# Patient Record
Sex: FEMALE
[De-identification: names, ages, dates, MRNs, and addresses within clinical notes are randomized per-mention and may not be internally consistent; named-entity substitution may affect disease eponyms.]

---

## 2019-09-18 PROBLEM — Z00.00 ENCOUNTER FOR PREVENTIVE HEALTH EXAMINATION: Status: ACTIVE | Noted: 2019-09-18

## 2019-09-25 ENCOUNTER — APPOINTMENT (OUTPATIENT)
Dept: PULMONOLOGY | Facility: CLINIC | Age: 74
End: 2019-09-25
Payer: MEDICARE

## 2019-09-25 VITALS
SYSTOLIC BLOOD PRESSURE: 142 MMHG | WEIGHT: 148 LBS | HEIGHT: 66.54 IN | DIASTOLIC BLOOD PRESSURE: 80 MMHG | OXYGEN SATURATION: 93 % | BODY MASS INDEX: 23.5 KG/M2 | HEART RATE: 87 BPM

## 2019-09-25 DIAGNOSIS — Z87.891 PERSONAL HISTORY OF NICOTINE DEPENDENCE: ICD-10-CM

## 2019-09-25 DIAGNOSIS — Z86.79 PERSONAL HISTORY OF OTHER DISEASES OF THE CIRCULATORY SYSTEM: ICD-10-CM

## 2019-09-25 DIAGNOSIS — Z86.73 PERSONAL HISTORY OF TRANSIENT ISCHEMIC ATTACK (TIA), AND CEREBRAL INFARCTION W/OUT RESIDUAL DEFICITS: ICD-10-CM

## 2019-09-25 PROCEDURE — 99204 OFFICE O/P NEW MOD 45 MIN: CPT | Mod: 25

## 2019-09-25 PROCEDURE — 94060 EVALUATION OF WHEEZING: CPT

## 2019-09-25 PROCEDURE — 94727 GAS DIL/WSHOT DETER LNG VOL: CPT

## 2019-09-25 PROCEDURE — 94729 DIFFUSING CAPACITY: CPT

## 2019-09-25 PROCEDURE — 85018 HEMOGLOBIN: CPT | Mod: QW

## 2019-09-25 PROCEDURE — 94664 DEMO&/EVAL PT USE INHALER: CPT | Mod: 59

## 2019-10-02 NOTE — PHYSICAL EXAM
[Normal Conjunctiva] : the conjunctiva exhibited no abnormalities [General Appearance - Well Nourished] : well nourished [General Appearance - Well Developed] : well developed [Normal Oropharynx] : normal oropharynx [Neck Appearance] : the appearance of the neck was normal [Jugular Venous Distention Increased] : there was no jugular-venous distention [Neck Cervical Mass (___cm)] : no neck mass was observed [Thyroid Diffuse Enlargement] : the thyroid was not enlarged [Arterial Pulses Normal] : the arterial pulses were normal [Heart Sounds] : normal S1 and S2 [Respiration, Rhythm And Depth] : normal respiratory rhythm and effort [Veins - Varicosity Changes] : no varicosital changes were noted in the lower extremities [Lungs Percussion] : the lungs were normal to percussion [Auscultation Breath Sounds / Voice Sounds] : lungs were clear to auscultation bilaterally [Bowel Sounds] : normal bowel sounds [Abdomen Soft] : soft [Abdomen Tenderness] : non-tender [Abnormal Walk] : normal gait [Nail Clubbing] : no clubbing of the fingernails [Cyanosis, Localized] : no localized cyanosis [] : no rash [Skin Turgor] : normal skin turgor [No Focal Deficits] : no focal deficits [Oriented To Time, Place, And Person] : oriented to person, place, and time [Impaired Insight] : insight and judgment were intact [Memory Recent] : recent memory was not impaired [Affect] : the affect was normal [FreeTextEntry1] : Uses Walker

## 2019-10-02 NOTE — HISTORY OF PRESENT ILLNESS
[FreeTextEntry1] : Followed by Dr Benz in the past\par 28 pack year smoker - DC on 6/16/1989\par Using Dulera, Combivent respimat prn, and past used xopenex mdi rescue\par Some choking with LAMA in past\par EF=60 %\par PDA ligation as child\par Atrial Myxoma resection\par Cerebral aneurysm repair. \par Can't lay flat for CT - dyspnea

## 2019-10-02 NOTE — ASSESSMENT
[FreeTextEntry1] : Severe COPD\par Restrictive component\par No evidence of diaphragmatic issue\par

## 2019-10-04 ENCOUNTER — MEDICATION RENEWAL (OUTPATIENT)
Age: 74
End: 2019-10-04

## 2019-11-14 ENCOUNTER — APPOINTMENT (OUTPATIENT)
Dept: PULMONOLOGY | Facility: CLINIC | Age: 74
End: 2019-11-14
Payer: MEDICARE

## 2019-11-14 VITALS — HEART RATE: 85 BPM | OXYGEN SATURATION: 93 %

## 2019-11-14 VITALS — HEIGHT: 66 IN | WEIGHT: 151 LBS | BODY MASS INDEX: 24.27 KG/M2

## 2019-11-14 VITALS — SYSTOLIC BLOOD PRESSURE: 136 MMHG | DIASTOLIC BLOOD PRESSURE: 80 MMHG

## 2019-11-14 PROCEDURE — 99214 OFFICE O/P EST MOD 30 MIN: CPT | Mod: 25

## 2019-11-14 PROCEDURE — 94010 BREATHING CAPACITY TEST: CPT

## 2019-11-14 RX ORDER — LEVALBUTEROL TARTRATE 45 UG/1
45 AEROSOL, METERED ORAL EVERY 4 HOURS
Qty: 3 | Refills: 3 | Status: DISCONTINUED | COMMUNITY
Start: 2019-10-04 | End: 2019-11-14

## 2019-11-14 NOTE — CONSULT LETTER
[Dear  ___] : Dear  [unfilled], [Please see my note below.] : Please see my note below. [Consult Letter:] : I had the pleasure of evaluating your patient, [unfilled]. [DrFabiola  ___] : Dr. RUBALCAVA [Sincerely,] : Sincerely,

## 2019-11-22 ENCOUNTER — MEDICATION RENEWAL (OUTPATIENT)
Age: 74
End: 2019-11-22

## 2020-02-06 ENCOUNTER — APPOINTMENT (OUTPATIENT)
Dept: PULMONOLOGY | Facility: CLINIC | Age: 75
End: 2020-02-06

## 2020-03-05 RX ORDER — FLUTICASONE FUROATE, UMECLIDINIUM BROMIDE AND VILANTEROL TRIFENATATE 100; 62.5; 25 UG/1; UG/1; UG/1
100-62.5-25 POWDER RESPIRATORY (INHALATION) DAILY
Qty: 3 | Refills: 0 | Status: DISCONTINUED | COMMUNITY
Start: 2019-09-25 | End: 2020-03-05

## 2020-03-09 ENCOUNTER — APPOINTMENT (OUTPATIENT)
Dept: PULMONOLOGY | Facility: CLINIC | Age: 75
End: 2020-03-09

## 2020-11-03 ENCOUNTER — APPOINTMENT (OUTPATIENT)
Dept: PULMONOLOGY | Facility: CLINIC | Age: 75
End: 2020-11-03
Payer: MEDICARE

## 2020-11-03 VITALS
DIASTOLIC BLOOD PRESSURE: 84 MMHG | OXYGEN SATURATION: 93 % | SYSTOLIC BLOOD PRESSURE: 156 MMHG | RESPIRATION RATE: 18 BRPM | WEIGHT: 140 LBS | HEART RATE: 102 BPM | HEIGHT: 66 IN | BODY MASS INDEX: 22.5 KG/M2

## 2020-11-03 PROCEDURE — 99214 OFFICE O/P EST MOD 30 MIN: CPT

## 2020-11-03 RX ORDER — OXYCODONE AND ACETAMINOPHEN 5; 325 MG/1; MG/1
5-325 TABLET ORAL
Qty: 11 | Refills: 0 | Status: DISCONTINUED | COMMUNITY
Start: 2020-07-27 | End: 2020-11-03

## 2020-11-03 RX ORDER — VERAPAMIL HYDROCHLORIDE 300 MG/1
300 CAPSULE, EXTENDED RELEASE ORAL
Refills: 0 | Status: DISCONTINUED | COMMUNITY
End: 2020-11-03

## 2020-11-03 RX ORDER — ALPRAZOLAM 0.5 MG/1
0.5 TABLET ORAL
Qty: 90 | Refills: 0 | Status: DISCONTINUED | COMMUNITY
Start: 2019-11-18 | End: 2020-11-03

## 2020-11-03 RX ORDER — ALPRAZOLAM 0.25 MG/1
0.25 TABLET ORAL
Qty: 90 | Refills: 0 | Status: DISCONTINUED | COMMUNITY
Start: 2019-09-25 | End: 2020-11-03

## 2020-11-03 RX ORDER — TIOTROPIUM BROMIDE INHALATION SPRAY 3.12 UG/1
2.5 SPRAY, METERED RESPIRATORY (INHALATION) DAILY
Qty: 3 | Refills: 3 | Status: DISCONTINUED | COMMUNITY
Start: 2019-11-14 | End: 2020-11-03

## 2020-11-03 RX ORDER — VERAPAMIL HYDROCHLORIDE 180 MG/1
180 CAPSULE, DELAYED RELEASE PELLETS ORAL
Qty: 90 | Refills: 0 | Status: DISCONTINUED | COMMUNITY
Start: 2020-09-27 | End: 2020-11-03

## 2020-11-03 NOTE — HISTORY OF PRESENT ILLNESS
[FreeTextEntry1] : Followed by Dr Benz in the past\par 28 pack year smoker - DC on 6/16/1989\par Using Dulera, Combivent respimat prn, and past used xopenex mdi rescue\par Some choking with LAMA in past\par EF=60 %\par PDA ligation as child\par Atrial Myxoma resection\par Cerebral aneurysm repair. \par Can't lay flat for CT - dyspnea\par \par 11/3/20\par PINO\par Needs hip surgery\par Needs new 02 orders

## 2020-11-03 NOTE — CONSULT LETTER
[Dear  ___] : Dear  [unfilled], [Consult Letter:] : I had the pleasure of evaluating your patient, [unfilled]. [Please see my note below.] : Please see my note below. [Sincerely,] : Sincerely, [DrFabiola  ___] : Dr. RUBALCAVA

## 2021-02-02 ENCOUNTER — APPOINTMENT (OUTPATIENT)
Dept: PULMONOLOGY | Facility: CLINIC | Age: 76
End: 2021-02-02

## 2021-05-11 ENCOUNTER — RX CHANGE (OUTPATIENT)
Age: 76
End: 2021-05-11

## 2021-05-11 RX ORDER — MOMETASONE FUROATE AND FORMOTEROL FUMARATE DIHYDRATE 100; 5 UG/1; UG/1
100-5 AEROSOL RESPIRATORY (INHALATION) TWICE DAILY
Qty: 3 | Refills: 3 | Status: DISCONTINUED | COMMUNITY
Start: 1900-01-01 | End: 2021-05-11

## 2021-05-11 RX ORDER — BUDESONIDE AND FORMOTEROL FUMARATE DIHYDRATE 160; 4.5 UG/1; UG/1
160-4.5 AEROSOL RESPIRATORY (INHALATION) TWICE DAILY
Qty: 3 | Refills: 0 | Status: DISCONTINUED | COMMUNITY
Start: 2021-05-11 | End: 2021-05-11

## 2021-10-06 ENCOUNTER — APPOINTMENT (OUTPATIENT)
Dept: PULMONOLOGY | Facility: CLINIC | Age: 76
End: 2021-10-06
Payer: MEDICARE

## 2021-10-06 PROCEDURE — 99213 OFFICE O/P EST LOW 20 MIN: CPT | Mod: 95

## 2021-10-06 RX ORDER — PREDNISONE 10 MG/1
10 TABLET ORAL DAILY
Qty: 90 | Refills: 1 | Status: DISCONTINUED | COMMUNITY
Start: 2021-03-15 | End: 2021-10-06

## 2021-10-06 RX ORDER — AZITHROMYCIN 250 MG/1
250 TABLET, FILM COATED ORAL
Qty: 45 | Refills: 3 | Status: ACTIVE | COMMUNITY
Start: 2021-10-06 | End: 1900-01-01

## 2021-10-06 RX ORDER — PREDNISONE 10 MG/1
10 TABLET ORAL DAILY
Qty: 90 | Refills: 3 | Status: ACTIVE | COMMUNITY
Start: 2021-10-06 | End: 1900-01-01

## 2021-10-06 RX ORDER — PREDNISONE 10 MG/1
10 TABLET ORAL DAILY
Qty: 20 | Refills: 0 | Status: DISCONTINUED | COMMUNITY
Start: 2019-11-14 | End: 2021-10-06

## 2021-10-06 RX ORDER — ALBUTEROL SULFATE 2.5 MG/3ML
(2.5 MG/3ML) SOLUTION RESPIRATORY (INHALATION)
Qty: 180 | Refills: 1 | Status: DISCONTINUED | COMMUNITY
Start: 2020-11-03 | End: 2021-10-06

## 2021-10-06 NOTE — PHYSICAL EXAM
[General Appearance - Well Developed] : well developed [General Appearance - Well Nourished] : well nourished [Normal Conjunctiva] : the conjunctiva exhibited no abnormalities [Normal Oropharynx] : normal oropharynx [Neck Appearance] : the appearance of the neck was normal [Neck Cervical Mass (___cm)] : no neck mass was observed [Jugular Venous Distention Increased] : there was no jugular-venous distention [Thyroid Diffuse Enlargement] : the thyroid was not enlarged [Heart Sounds] : normal S1 and S2 [Arterial Pulses Normal] : the arterial pulses were normal [Veins - Varicosity Changes] : no varicosital changes were noted in the lower extremities [Respiration, Rhythm And Depth] : normal respiratory rhythm and effort [Auscultation Breath Sounds / Voice Sounds] : lungs were clear to auscultation bilaterally [Lungs Percussion] : the lungs were normal to percussion [Bowel Sounds] : normal bowel sounds [Abdomen Soft] : soft [Abdomen Tenderness] : non-tender [Abnormal Walk] : normal gait [Nail Clubbing] : no clubbing of the fingernails [Cyanosis, Localized] : no localized cyanosis [No Focal Deficits] : no focal deficits [Oriented To Time, Place, And Person] : oriented to person, place, and time [Impaired Insight] : insight and judgment were intact [Affect] : the affect was normal [Memory Recent] : recent memory was not impaired [Skin Turgor] : normal skin turgor [] : no rash [FreeTextEntry1] : Uses Walker

## 2021-10-06 NOTE — ASSESSMENT
[FreeTextEntry1] : Severe COPD\par Restrictive/ fibrotic  component\par No evidence of diaphragmatic issue\par

## 2021-10-06 NOTE — HISTORY OF PRESENT ILLNESS
[Home] : at home, [unfilled] , at the time of the visit. [Medical Office: ([facility])___] : at the medical office located in  [Verbal consent obtained from patient] : the patient, [unfilled] [FreeTextEntry1] : Followed by Dr Benz in the past\par 28 pack year smoker - DC on 6/16/1989\par Using Dulera, Combivent respimat prn, and past used xopenex mdi rescue\par Some choking with LAMA in past\par EF=60 %\par PDA ligation as child\par Atrial Myxoma resection\par Cerebral aneurysm repair. \par Can't lay flat for CT - dyspnea\par \par 11/3/20\par PINO\par Needs hip surgery\par Needs new 02 orders \par \par 10/6/21\par Worsening PINO with desat\par Using and benefiting from 02\par On Dulera BID and COMVIVENT q 3h\par Taking prednisone 10 mg /day\par Mild cough\par No mucous

## 2021-11-17 ENCOUNTER — APPOINTMENT (OUTPATIENT)
Dept: PULMONOLOGY | Facility: CLINIC | Age: 76
End: 2021-11-17
Payer: MEDICARE

## 2021-11-17 PROCEDURE — 99213 OFFICE O/P EST LOW 20 MIN: CPT | Mod: 95

## 2021-11-17 RX ORDER — CHROMIUM 200 MCG
TABLET ORAL
Refills: 0 | Status: ACTIVE | COMMUNITY

## 2021-11-17 RX ORDER — MAGNESIUM OXIDE/MAG AA CHELATE 300 MG
CAPSULE ORAL
Refills: 0 | Status: ACTIVE | COMMUNITY

## 2021-11-17 NOTE — HISTORY OF PRESENT ILLNESS
[Home] : at home, [unfilled] , at the time of the visit. [Medical Office: ([facility])___] : at the medical office located in  [Verbal consent obtained from patient] : the patient, [unfilled] [FreeTextEntry1] : Followed by Dr Benz in the past\par 28 pack year smoker - DC on 6/16/1989\par Using Dulera, Combivent respimat prn, and past used xopenex mdi rescue\par Some choking with LAMA in past\par EF=60 %\par PDA ligation as child\par Atrial Myxoma resection\par Cerebral aneurysm repair. \par Can't lay flat for CT - dyspnea\par \par 11/3/20\par PINO\par Needs hip surgery\par Needs new 02 orders \par \par 10/6/21\par Worsening PINO with desat\par Using and benefiting from 02\par On Dulera BID and COMVIVENT q 3h\par Taking prednisone 10 mg /day\par Mild cough\par No mucous \par \par 11/17/21\par OK\par PINO\par Rhinorrhea

## 2022-01-13 ENCOUNTER — APPOINTMENT (OUTPATIENT)
Dept: PULMONOLOGY | Facility: CLINIC | Age: 77
End: 2022-01-13
Payer: MEDICARE

## 2022-01-13 DIAGNOSIS — Z20.822 CONTACT WITH AND (SUSPECTED) EXPOSURE TO COVID-19: ICD-10-CM

## 2022-01-13 PROCEDURE — 99213 OFFICE O/P EST LOW 20 MIN: CPT | Mod: 95

## 2022-01-14 ENCOUNTER — LABORATORY RESULT (OUTPATIENT)
Age: 77
End: 2022-01-14

## 2022-01-17 LAB
ALBUMIN SERPL ELPH-MCNC: 4.3 G/DL
ALP BLD-CCNC: 71 U/L
ALT SERPL-CCNC: 16 U/L
ANION GAP SERPL CALC-SCNC: 13 MMOL/L
AST SERPL-CCNC: 23 U/L
BASOPHILS # BLD AUTO: 0.04 K/UL
BASOPHILS NFR BLD AUTO: 0.9 %
BILIRUB SERPL-MCNC: 0.2 MG/DL
BUN SERPL-MCNC: 14 MG/DL
CALCIUM SERPL-MCNC: 9.6 MG/DL
CHLORIDE SERPL-SCNC: 96 MMOL/L
CO2 SERPL-SCNC: 25 MMOL/L
CREAT SERPL-MCNC: 0.75 MG/DL
CRP SERPL HS-MCNC: 5.21 MG/L
DEPRECATED D DIMER PPP IA-ACNC: <150 NG/ML DDU
EOSINOPHIL # BLD AUTO: 0 K/UL
EOSINOPHIL NFR BLD AUTO: 0 %
GLUCOSE SERPL-MCNC: 123 MG/DL
HCT VFR BLD CALC: 42.2 %
HGB BLD-MCNC: 13.5 G/DL
LYMPHOCYTES # BLD AUTO: 1.14 K/UL
LYMPHOCYTES NFR BLD AUTO: 23.5 %
MAN DIFF?: NORMAL
MCHC RBC-ENTMCNC: 29 PG
MCHC RBC-ENTMCNC: 32 GM/DL
MCV RBC AUTO: 90.6 FL
MONOCYTES # BLD AUTO: 0.72 K/UL
MONOCYTES NFR BLD AUTO: 14.8 %
NEUTROPHILS # BLD AUTO: 2.94 K/UL
NEUTROPHILS NFR BLD AUTO: 60.8 %
PLATELET # BLD AUTO: 217 K/UL
POTASSIUM SERPL-SCNC: 5 MMOL/L
PROCALCITONIN SERPL-MCNC: 0.06 NG/ML
PROT SERPL-MCNC: 7.4 G/DL
RBC # BLD: 4.66 M/UL
RBC # FLD: 13 %
SODIUM SERPL-SCNC: 135 MMOL/L
WBC # FLD AUTO: 4.84 K/UL

## 2022-01-17 RX ORDER — HYDROCODONE BITARTRATE AND HOMATROPINE METHYLBROMIDE 5; 1.5 MG/5ML; MG/5ML
5-1.5 SYRUP ORAL
Qty: 240 | Refills: 0 | Status: DISCONTINUED | COMMUNITY
Start: 2022-01-13 | End: 2022-01-17

## 2022-01-17 RX ORDER — PROMETHAZINE HYDROCHLORIDE AND CODEINE PHOSPHATE 6.25; 1 MG/5ML; MG/5ML
6.25-1 SOLUTION ORAL
Qty: 240 | Refills: 0 | Status: COMPLETED | COMMUNITY
Start: 2022-01-17 | End: 2022-01-17

## 2022-01-17 NOTE — HISTORY OF PRESENT ILLNESS
[Home] : at home, [unfilled] , at the time of the visit. [Medical Office: ([facility])___] : at the medical office located in  [Verbal consent obtained from patient] : the patient, [unfilled] [FreeTextEntry1] : Followed by Dr Benz in the past\par 28 pack year smoker - DC on 6/16/1989\par Using Dulera, Combivent respimat prn, and past used xopenex mdi rescue\par Some choking with LAMA in past\par EF=60 %\par PDA ligation as child\par Atrial Myxoma resection\par Cerebral aneurysm repair. \par Can't lay flat for CT - dyspnea\par \par 11/3/20\par PINO\par Needs hip surgery\par Needs new 02 orders \par \par 10/6/21\par Worsening PINO with desat\par Using and benefiting from 02\par On Dulera BID and COMVIVENT q 3h\par Taking prednisone 10 mg /day\par Mild cough\par No mucous \par \par 11/17/21\par OK\par PINO\par Rhinorrhea \par \par 1/13/22\par Exposed to son who has COVID 1/7/22\par Coughing hard since last night\par  is as well

## 2022-01-17 NOTE — ASSESSMENT
[FreeTextEntry1] : Severe COPD\par Restrictive/ fibrotic  component\par No evidence of diaphragmatic issue\par \par 1/13/22\par COUGH due to COVID\par

## 2022-02-22 ENCOUNTER — APPOINTMENT (OUTPATIENT)
Dept: PULMONOLOGY | Facility: CLINIC | Age: 77
End: 2022-02-22
Payer: MEDICARE

## 2022-02-22 PROCEDURE — 99213 OFFICE O/P EST LOW 20 MIN: CPT | Mod: 95

## 2022-02-22 RX ORDER — HYDROCODONE BITARTRATE AND HOMATROPINE METHYLBROMIDE 5; 1.5 MG/5ML; MG/5ML
5-1.5 SYRUP ORAL
Qty: 240 | Refills: 0 | Status: ACTIVE | COMMUNITY
Start: 2022-02-22 | End: 1900-01-01

## 2022-02-22 RX ORDER — CODEINE POLISTIREX AND CHLORPHENIRAMINE POLISTIREX 14.7; 2.8 MG/5ML; MG/5ML
14.7-2.8 SUSPENSION, EXTENDED RELEASE ORAL
Qty: 200 | Refills: 0 | Status: ACTIVE | COMMUNITY
Start: 2022-01-17

## 2022-02-22 RX ORDER — METHYLPREDNISOLONE 4 MG/1
4 TABLET ORAL
Qty: 1 | Refills: 1 | Status: DISCONTINUED | COMMUNITY
Start: 2022-01-17 | End: 2022-02-22

## 2022-02-22 RX ORDER — AZITHROMYCIN 250 MG/1
250 TABLET, FILM COATED ORAL
Qty: 1 | Refills: 0 | Status: DISCONTINUED | COMMUNITY
Start: 2022-01-13 | End: 2022-02-22

## 2022-02-22 NOTE — HISTORY OF PRESENT ILLNESS
[Home] : at home, [unfilled] , at the time of the visit. [Medical Office: ([facility])___] : at the medical office located in  [Verbal consent obtained from patient] : the patient, [unfilled] [FreeTextEntry1] : Followed by Dr Benz in the past\par 28 pack year smoker - DC on 6/16/1989\par Using Dulera, Combivent respimat prn, and past used xopenex mdi rescue\par Some choking with LAMA in past\par EF=60 %\par PDA ligation as child\par Atrial Myxoma resection\par Cerebral aneurysm repair. \par Can't lay flat for CT - dyspnea\par \par 11/3/20\par PINO\par Needs hip surgery\par Needs new 02 orders \par \par 10/6/21\par Worsening PINO with desat\par Using and benefiting from 02\par On Dulera BID and COMVIVENT q 3h\par Taking prednisone 10 mg /day\par Mild cough\par No mucous \par \par 11/17/21\par OK\par PINO\par Rhinorrhea \par \par 1/13/22\par Exposed to son who has COVID 1/7/22\par Coughing hard since last night\par  is as well \par \par 2/22/22\par Rare cough\par Rhinitis\par Better \par Off prednisone

## 2022-02-22 NOTE — ASSESSMENT
[FreeTextEntry1] : Severe COPD\par Restrictive/ fibrotic  component\par No evidence of diaphragmatic issue\par COUGH \par

## 2022-02-24 ENCOUNTER — RX CHANGE (OUTPATIENT)
Age: 77
End: 2022-02-24

## 2022-02-24 RX ORDER — BUDESONIDE AND FORMOTEROL FUMARATE DIHYDRATE 160; 4.5 UG/1; UG/1
160-4.5 AEROSOL RESPIRATORY (INHALATION) TWICE DAILY
Qty: 1 | Refills: 5 | Status: DISCONTINUED | COMMUNITY
Start: 2021-11-17 | End: 2022-02-24

## 2022-03-21 ENCOUNTER — RX CHANGE (OUTPATIENT)
Age: 77
End: 2022-03-21

## 2022-03-21 RX ORDER — AZELASTINE HYDROCHLORIDE 137 UG/1
0.1 SPRAY, METERED NASAL DAILY
Qty: 3 | Refills: 5 | Status: ACTIVE | COMMUNITY
Start: 2022-02-22 | End: 1900-01-01

## 2022-03-23 ENCOUNTER — RX RENEWAL (OUTPATIENT)
Age: 77
End: 2022-03-23

## 2022-05-23 ENCOUNTER — RX CHANGE (OUTPATIENT)
Age: 77
End: 2022-05-23

## 2022-05-23 ENCOUNTER — RX RENEWAL (OUTPATIENT)
Age: 77
End: 2022-05-23

## 2022-05-23 RX ORDER — MOMETASONE FUROATE AND FORMOTEROL FUMARATE DIHYDRATE 200; 5 UG/1; UG/1
200-5 AEROSOL RESPIRATORY (INHALATION)
Qty: 3 | Refills: 3 | Status: DISCONTINUED | COMMUNITY
Start: 2021-10-06 | End: 2022-05-23

## 2022-06-02 ENCOUNTER — RX CHANGE (OUTPATIENT)
Age: 77
End: 2022-06-02

## 2022-06-07 RX ORDER — FLUTICASONE PROPIONATE AND SALMETEROL XINAFOATE 115; 21 UG/1; UG/1
115-21 AEROSOL, METERED RESPIRATORY (INHALATION)
Qty: 3 | Refills: 0 | Status: DISCONTINUED | COMMUNITY
Start: 2022-05-23 | End: 2022-06-07

## 2022-06-07 RX ORDER — BUDESONIDE AND FORMOTEROL FUMARATE DIHYDRATE 160; 4.5 UG/1; UG/1
160-4.5 AEROSOL RESPIRATORY (INHALATION) TWICE DAILY
Qty: 3 | Refills: 3 | Status: DISCONTINUED | COMMUNITY
Start: 2022-02-24 | End: 2022-06-07

## 2022-06-16 ENCOUNTER — APPOINTMENT (OUTPATIENT)
Dept: PULMONOLOGY | Facility: CLINIC | Age: 77
End: 2022-06-16
Payer: MEDICARE

## 2022-06-16 DIAGNOSIS — J30.9 ALLERGIC RHINITIS, UNSPECIFIED: ICD-10-CM

## 2022-06-16 PROCEDURE — 99213 OFFICE O/P EST LOW 20 MIN: CPT | Mod: 95

## 2022-06-16 NOTE — HISTORY OF PRESENT ILLNESS
[Home] : at home, [unfilled] , at the time of the visit. [Medical Office: ([facility])___] : at the medical office located in  [Verbal consent obtained from patient] : the patient, [unfilled] [FreeTextEntry1] : Followed by Dr Benz in the past\par 28 pack year smoker - DC on 6/16/1989\par Using Dulera, Combivent respimat prn, and past used xopenex mdi rescue\par Some choking with LAMA in past\par EF=60 %\par PDA ligation as child\par Atrial Myxoma resection\par Cerebral aneurysm repair. \par Can't lay flat for CT - dyspnea\par \par 11/3/20\par PINO\par Needs hip surgery\par Needs new 02 orders \par \par 10/6/21\par Worsening PINO with desat\par Using and benefiting from 02\par On Dulera BID and COMVIVENT q 3h\par Taking prednisone 10 mg /day\par Mild cough\par No mucous \par \par 11/17/21\par OK\par PINO\par Rhinorrhea \par \par 1/13/22\par Exposed to son who has COVID 1/7/22\par Coughing hard since last night\par  is as well \par \par 2/22/22\par Rare cough\par Rhinitis\par Better \par Off prednisone\par \par 6/16/22\par Increased PINO - and exertional desat\par No cough, sputum, edema or congestion \par Eats a lot when on steroid\par Stopped Azithromycin > one month ago

## 2022-08-29 ENCOUNTER — RX RENEWAL (OUTPATIENT)
Age: 77
End: 2022-08-29

## 2022-08-29 RX ORDER — ALBUTEROL SULFATE 90 UG/1
108 (90 BASE) INHALANT RESPIRATORY (INHALATION)
Qty: 3 | Refills: 3 | Status: ACTIVE | COMMUNITY
Start: 2022-08-29 | End: 1900-01-01

## 2022-11-18 ENCOUNTER — RX RENEWAL (OUTPATIENT)
Age: 77
End: 2022-11-18

## 2022-12-09 ENCOUNTER — RX RENEWAL (OUTPATIENT)
Age: 77
End: 2022-12-09

## 2022-12-20 RX ORDER — ALBUTEROL SULFATE 90 UG/1
108 (90 BASE) AEROSOL, METERED RESPIRATORY (INHALATION)
Qty: 3 | Refills: 0 | Status: COMPLETED | COMMUNITY
Start: 2019-09-25 | End: 2022-12-20

## 2022-12-20 RX ORDER — PREDNISONE 2.5 MG/1
2.5 TABLET ORAL
Qty: 90 | Refills: 2 | Status: COMPLETED | COMMUNITY
Start: 2022-06-16 | End: 2022-12-20

## 2022-12-20 RX ORDER — AZITHROMYCIN 250 MG/1
250 TABLET, FILM COATED ORAL
Qty: 36 | Refills: 3 | Status: COMPLETED | COMMUNITY
Start: 2022-06-16 | End: 2022-12-20

## 2022-12-20 RX ORDER — IPRATROPIUM BROMIDE AND ALBUTEROL 20; 100 UG/1; UG/1
20-100 SPRAY, METERED RESPIRATORY (INHALATION) 4 TIMES DAILY
Qty: 3 | Refills: 2 | Status: COMPLETED | COMMUNITY
Start: 2021-10-06 | End: 2022-12-20

## 2022-12-20 RX ORDER — PREDNISONE 2.5 MG/1
2.5 TABLET ORAL
Qty: 90 | Refills: 2 | Status: COMPLETED | COMMUNITY
Start: 2022-11-17 | End: 2022-12-20

## 2022-12-23 RX ORDER — MOMETASONE FUROATE AND FORMOTEROL FUMARATE DIHYDRATE 200; 5 UG/1; UG/1
200-5 AEROSOL RESPIRATORY (INHALATION)
Qty: 3 | Refills: 1 | Status: ACTIVE | COMMUNITY
Start: 2022-06-16 | End: 1900-01-01

## 2023-07-05 RX ORDER — IPRATROPIUM BROMIDE AND ALBUTEROL 20; 100 UG/1; UG/1
20-100 SPRAY, METERED RESPIRATORY (INHALATION)
Qty: 1 | Refills: 0 | Status: ACTIVE | COMMUNITY
Start: 2021-10-05 | End: 1900-01-01

## 2023-07-12 ENCOUNTER — APPOINTMENT (OUTPATIENT)
Dept: PULMONOLOGY | Facility: CLINIC | Age: 78
End: 2023-07-12

## 2023-07-14 ENCOUNTER — APPOINTMENT (OUTPATIENT)
Dept: PULMONOLOGY | Facility: CLINIC | Age: 78
End: 2023-07-14

## 2023-07-20 ENCOUNTER — APPOINTMENT (OUTPATIENT)
Dept: PULMONOLOGY | Facility: CLINIC | Age: 78
End: 2023-07-20
Payer: MEDICARE

## 2023-07-20 DIAGNOSIS — R06.09 OTHER FORMS OF DYSPNEA: ICD-10-CM

## 2023-07-20 DIAGNOSIS — J44.9 CHRONIC OBSTRUCTIVE PULMONARY DISEASE, UNSPECIFIED: ICD-10-CM

## 2023-07-20 PROCEDURE — 99442: CPT | Mod: 95

## 2023-07-20 RX ORDER — PREDNISONE 5 MG/1
5 TABLET ORAL DAILY
Qty: 90 | Refills: 1 | Status: ACTIVE | COMMUNITY
Start: 2023-07-20 | End: 1900-01-01

## 2023-07-21 NOTE — HISTORY OF PRESENT ILLNESS
[Home] : at home, [unfilled] , at the time of the visit. [Medical Office: ([facility])___] : at the medical office located in  [Verbal consent obtained from patient] : the patient, [unfilled] [FreeTextEntry1] : Followed by Dr Benz in the past\par 28 pack year smoker - DC on 6/16/1989\par Using Dulera, Combivent respimat prn, and past used xopenex mdi rescue\par Some choking with LAMA in past\par EF=60 %\par PDA ligation as child\par Atrial Myxoma resection\par Cerebral aneurysm repair. \par Can't lay flat for CT - dyspnea\par \par 11/3/20\par PINO\par Needs hip surgery\par Needs new 02 orders \par \par 10/6/21\par Worsening PINO with desat\par Using and benefiting from 02\par On Dulera BID and COMVIVENT q 3h\par Taking prednisone 10 mg /day\par Mild cough\par No mucous \par \par 11/17/21\par OK\par PINO\par Rhinorrhea \par \par 1/13/22\par Exposed to son who has COVID 1/7/22\par Coughing hard since last night\par  is as well \par \par 2/22/22\par Rare cough\par Rhinitis\par Better \par Off prednisone\par \par 6/16/22\par Increased PINO - and exertional desat\par No cough, sputum, edema or congestion \par Eats a lot when on steroid\par Stopped Azithromycin > one month ago\par \par 7/20/23\par Needs Combivent more often\par Dulera doesn't help\par Compliant with and benefiting from nasal oxygen\par POC less effective but can't get in to the office to qualify for continuous 02\par \par REPORTS 02SAT = 86 WITH 15 STEPS ON POC AT 5 LPM PULSED \par \par

## 2023-11-29 RX ORDER — IPRATROPIUM BROMIDE AND ALBUTEROL 20; 100 UG/1; UG/1
20-100 SPRAY, METERED RESPIRATORY (INHALATION) 4 TIMES DAILY
Qty: 3 | Refills: 3 | Status: ACTIVE | COMMUNITY
Start: 2023-07-20 | End: 1900-01-01

## 2024-06-04 ENCOUNTER — APPOINTMENT (OUTPATIENT)
Dept: PULMONOLOGY | Facility: CLINIC | Age: 79
End: 2024-06-04
Payer: MEDICARE

## 2024-06-04 ENCOUNTER — TRANSCRIPTION ENCOUNTER (OUTPATIENT)
Age: 79
End: 2024-06-04

## 2024-06-04 DIAGNOSIS — J96.11 CHRONIC RESPIRATORY FAILURE WITH HYPOXIA: ICD-10-CM

## 2024-06-04 DIAGNOSIS — R09.02 HYPOXEMIA: ICD-10-CM

## 2024-06-04 DIAGNOSIS — F41.9 ANXIETY DISORDER, UNSPECIFIED: ICD-10-CM

## 2024-06-04 DIAGNOSIS — R05.3 CHRONIC COUGH: ICD-10-CM

## 2024-06-04 DIAGNOSIS — Z99.81 HYPOXEMIA: ICD-10-CM

## 2024-06-04 DIAGNOSIS — J44.9 CHRONIC OBSTRUCTIVE PULMONARY DISEASE, UNSPECIFIED: ICD-10-CM

## 2024-06-04 PROCEDURE — 99443: CPT | Mod: 93

## 2024-06-04 RX ORDER — PREDNISONE 5 MG/1
5 TABLET ORAL DAILY
Qty: 90 | Refills: 1 | Status: ACTIVE | COMMUNITY
Start: 2024-06-04 | End: 1900-01-01

## 2024-06-04 RX ORDER — BUDESONIDE, GLYCOPYRROLATE, AND FORMOTEROL FUMARATE 160; 9; 4.8 UG/1; UG/1; UG/1
160-9-4.8 AEROSOL, METERED RESPIRATORY (INHALATION) TWICE DAILY
Qty: 3 | Refills: 3 | Status: ACTIVE | COMMUNITY
Start: 2024-06-04 | End: 1900-01-01

## 2024-06-04 RX ORDER — ALPRAZOLAM 0.5 MG/1
0.5 TABLET ORAL
Qty: 90 | Refills: 0 | Status: ACTIVE | COMMUNITY
Start: 2024-06-04 | End: 1900-01-01

## 2024-06-04 RX ORDER — HYDROCODONE BITARTRATE AND HOMATROPINE METHYLBROMIDE 1.5; 5 MG/5ML; MG/5ML
5-1.5 SOLUTION ORAL EVERY 4 HOURS
Qty: 240 | Refills: 0 | Status: ACTIVE | COMMUNITY
Start: 2024-06-04 | End: 1900-01-01

## 2024-06-04 NOTE — HISTORY OF PRESENT ILLNESS
[Home] : at home, [unfilled] , at the time of the visit. [Medical Office: ([facility])___] : at the medical office located in  [Verbal consent obtained from patient] : the patient, [unfilled] [FreeTextEntry1] : Followed by Dr Benz in the past 28 pack year smoker - DC on 6/16/1989 Using Dulera, Combivent respimat prn, and past used xopenex mdi rescue Some choking with LAMA in past EF=60 % PDA ligation as child Atrial Myxoma resection Cerebral aneurysm repair.  Can't lay flat for CT - dyspnea  11/3/20 PINO Needs hip surgery Needs new 02 orders   10/6/21 Worsening PINO with desat Using and benefiting from 02 On Dulera BID and COMVIVENT q 3h Taking prednisone 10 mg /day Mild cough No mucous   11/17/21 OK PINO Rhinorrhea   1/13/22 Exposed to son who has COVID 1/7/22 Coughing hard since last night  is as well   2/22/22 Rare cough Rhinitis Better  Off prednisone  6/16/22 Increased PINO - and exertional desat No cough, sputum, edema or congestion  Eats a lot when on steroid Stopped Azithromycin > one month ago  7/20/23 Needs Combivent more often Dulera doesn't help Compliant with and benefiting from nasal oxygen POC less effective but can't get in to the office to qualify for continuous 02  REPORTS 02SAT = 86 WITH 15 STEPS ON POC AT 5 LPM PULSED   6/4/24 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  Wants Breztri, Alprazolam ().5 prn up to TID; Hycodan

## 2024-07-05 RX ORDER — PREDNISONE 10 MG/1
10 TABLET ORAL
Qty: 30 | Refills: 1 | Status: ACTIVE | COMMUNITY
Start: 2024-07-05 | End: 1900-01-01

## 2024-09-30 ENCOUNTER — TRANSCRIPTION ENCOUNTER (OUTPATIENT)
Age: 79
End: 2024-09-30

## 2024-10-01 ENCOUNTER — APPOINTMENT (OUTPATIENT)
Dept: PULMONOLOGY | Facility: CLINIC | Age: 79
End: 2024-10-01

## 2025-07-14 RX ORDER — FLUTICASONE FUROATE AND VILANTEROL TRIFENATATE 100; 25 UG/1; UG/1
100-25 POWDER RESPIRATORY (INHALATION)
Qty: 1 | Refills: 5 | Status: ACTIVE | COMMUNITY
Start: 2025-07-14 | End: 1900-01-01